# Patient Record
Sex: FEMALE | Race: WHITE
[De-identification: names, ages, dates, MRNs, and addresses within clinical notes are randomized per-mention and may not be internally consistent; named-entity substitution may affect disease eponyms.]

---

## 2021-03-29 ENCOUNTER — HOSPITAL ENCOUNTER (OUTPATIENT)
Dept: HOSPITAL 76 - LAB.R | Age: 31
Discharge: HOME | End: 2021-03-29
Attending: ADVANCED PRACTICE MIDWIFE
Payer: COMMERCIAL

## 2021-03-29 DIAGNOSIS — Z32.01: Primary | ICD-10-CM

## 2021-03-29 LAB
AMPHET UR QL SCN: NEGATIVE
BARBITURATES UR QL SCN>300 NG/ML: NEGATIVE
BENZODIAZ UR QL SCN: NEGATIVE
CLARITY UR REFRACT.AUTO: CLEAR
COCAINE UR-SCNC: NEGATIVE UMOL/L
GLUCOSE UR QL STRIP.AUTO: NEGATIVE MG/DL
KETONES UR QL STRIP.AUTO: NEGATIVE MG/DL
METHADONE UR QL SCN: NEGATIVE
METHAMPHET UR QL SCN: NEGATIVE
NITRITE UR QL STRIP.AUTO: NEGATIVE
OPIATES UR QL SCN: NEGATIVE
PH UR STRIP.AUTO: 6 PH (ref 5–7.5)
PROT UR STRIP.AUTO-MCNC: NEGATIVE MG/DL
RBC # UR STRIP.AUTO: NEGATIVE /UL
RBC # URNS HPF: (no result) /HPF (ref 0–5)
SP GR UR STRIP.AUTO: 1.02 (ref 1–1.03)
SQUAMOUS URNS QL MICRO: (no result)
THC UR QL SCN: NEGATIVE
UROBILINOGEN UR QL STRIP.AUTO: (no result) E.U./DL
UROBILINOGEN UR STRIP.AUTO-MCNC: NEGATIVE MG/DL
VOLATILE DRUGS POS SERPL SCN: (no result)
WBC # UR MANUAL: (no result) /HPF (ref 0–5)

## 2021-03-29 PROCEDURE — 87086 URINE CULTURE/COLONY COUNT: CPT

## 2021-03-29 PROCEDURE — 80306 DRUG TEST PRSMV INSTRMNT: CPT

## 2021-03-29 PROCEDURE — 81001 URINALYSIS AUTO W/SCOPE: CPT

## 2021-04-12 ENCOUNTER — HOSPITAL ENCOUNTER (OUTPATIENT)
Dept: HOSPITAL 76 - DI | Age: 31
Discharge: HOME | End: 2021-04-12
Attending: ADVANCED PRACTICE MIDWIFE
Payer: COMMERCIAL

## 2021-04-12 DIAGNOSIS — O20.8: ICD-10-CM

## 2021-04-12 DIAGNOSIS — Z3A.01: ICD-10-CM

## 2021-04-12 DIAGNOSIS — Z32.01: Primary | ICD-10-CM

## 2021-04-13 NOTE — ULTRASOUND REPORT
PROCEDURE:  OB First Trimester w/TV

 

INDICATIONS:  POSITIVE PREGNANCY TEST

 

OUTSIDE/PRIOR DATING DATA:  

Last menstrual period (LMP): 2/20/2021.  

LMP-based estimated date of delivery (XAVIER): 11/21/2021.  

First dating scan (date and location): 4/12/2021.  

Estimated date of delivery (XAVIER) from first dating scan: 11/29/2021.  

 

TECHNIQUE:  

Real-time scanning was performed of the fetus and maternal pelvic organs, with image documentation.  
Endovaginal scanning was also performed to better visualize the fetus and maternal ovaries.  

 

COMPARISON:  None

 

FINDINGS:     

 

Embryo:  Single live intrauterine pregnancy is identified with crown-rump length measuring 0.99 cm co
rresponding to 7 weeks 0 days. Fetal heart rate is identified at 141 bpm. There is a subchorionic hem
orrhage in the fundal portion measuring approximately 0.8 x 0.7 x 1.0 cm. A second focus near the low
er uterine segment is present measuring 1.3 x 0.3 x 0.7 cm.

 

Measurement variability in dating:  +/- 4 weeks by LMP, +/- 7 days by mean sac diameter (use before 6
 weeks gestation if crown-rump length not able to be measured), +/- 5 days by crown-rump length (6-12
 weeks gestation).  

 

Maternal organs:  Ovaries right corpus luteal cyst is present.. 

 

IMPRESSION:  

 

1. Single live intrauterine pregnancy with gestational age of 7 weeks 0 days.

 

 

2.Two foci of subchorionic hemorrhage as above.

 

Reviewed by: Swapna Ventura MD on 4/13/2021 5:10 PM PDT

Approved by: Swapna Ventura MD on 4/13/2021 5:10 PM PDT

 

 

Station ID:  [id number]

## 2021-05-08 ENCOUNTER — HOSPITAL ENCOUNTER (OUTPATIENT)
Dept: HOSPITAL 76 - LAB | Age: 31
Discharge: HOME | End: 2021-05-08
Attending: ADVANCED PRACTICE MIDWIFE
Payer: COMMERCIAL

## 2021-05-08 DIAGNOSIS — Z36.8A: ICD-10-CM

## 2021-05-08 DIAGNOSIS — Z36.89: ICD-10-CM

## 2021-05-08 DIAGNOSIS — Z34.90: Primary | ICD-10-CM

## 2021-05-08 LAB
BASOPHILS NFR BLD AUTO: 0 10^3/UL (ref 0–0.1)
BASOPHILS NFR BLD AUTO: 0.4 %
EOSINOPHIL # BLD AUTO: 0.3 10^3/UL (ref 0–0.7)
EOSINOPHIL NFR BLD AUTO: 3.4 %
ERYTHROCYTE [DISTWIDTH] IN BLOOD BY AUTOMATED COUNT: 12 % (ref 12–15)
HCT VFR BLD AUTO: 35.9 % (ref 37–47)
HGB UR QL STRIP: 12.5 G/DL (ref 12–16)
LYMPHOCYTES # SPEC AUTO: 2.1 10^3/UL (ref 1.5–3.5)
LYMPHOCYTES NFR BLD AUTO: 22.8 %
MCH RBC QN AUTO: 30.4 PG (ref 27–31)
MCHC RBC AUTO-ENTMCNC: 34.8 G/DL (ref 32–36)
MCV RBC AUTO: 87.3 FL (ref 81–99)
MONOCYTES # BLD AUTO: 0.8 10^3/UL (ref 0–1)
MONOCYTES NFR BLD AUTO: 8.6 %
NEUTROPHILS # BLD AUTO: 5.8 10^3/UL (ref 1.5–6.6)
NEUTROPHILS # SNV AUTO: 9 X10^3/UL (ref 4.8–10.8)
NEUTROPHILS NFR BLD AUTO: 64 %
NRBC # BLD AUTO: 0 /100WBC
NRBC # BLD AUTO: 0 X10^3/UL
PDW BLD AUTO: 10.1 FL (ref 7.9–10.8)
PLATELET # BLD: 181 10^3/UL (ref 130–450)
RBC MAR: 4.11 10^6/UL (ref 4.2–5.4)

## 2021-05-08 PROCEDURE — 86803 HEPATITIS C AB TEST: CPT

## 2021-05-08 PROCEDURE — 86901 BLOOD TYPING SEROLOGIC RH(D): CPT

## 2021-05-08 PROCEDURE — 86900 BLOOD TYPING SEROLOGIC ABO: CPT

## 2021-05-08 PROCEDURE — 86850 RBC ANTIBODY SCREEN: CPT

## 2021-05-08 PROCEDURE — 87389 HIV-1 AG W/HIV-1&-2 AB AG IA: CPT

## 2021-05-08 PROCEDURE — 36415 COLL VENOUS BLD VENIPUNCTURE: CPT

## 2021-05-08 PROCEDURE — 86762 RUBELLA ANTIBODY: CPT

## 2021-05-08 PROCEDURE — 85025 COMPLETE CBC W/AUTO DIFF WBC: CPT

## 2021-05-08 PROCEDURE — 87340 HEPATITIS B SURFACE AG IA: CPT

## 2021-05-08 PROCEDURE — 86787 VARICELLA-ZOSTER ANTIBODY: CPT

## 2021-05-08 PROCEDURE — 86592 SYPHILIS TEST NON-TREP QUAL: CPT

## 2021-05-10 LAB
HEPATITIS B SURFACE ANTIGEN: (no result)
HEPATITIS C ANTIBODY: (no result)
HIV AG/AB 4TH GEN: (no result)
SIGNAL TO CUT-OFF: 0 (ref ?–1)

## 2021-05-22 ENCOUNTER — HOSPITAL ENCOUNTER (OUTPATIENT)
Dept: HOSPITAL 76 - LAB | Age: 31
Discharge: HOME | End: 2021-05-22
Attending: ADVANCED PRACTICE MIDWIFE
Payer: COMMERCIAL

## 2021-05-22 DIAGNOSIS — Z36.89: ICD-10-CM

## 2021-05-22 DIAGNOSIS — Z34.90: Primary | ICD-10-CM

## 2021-05-22 DIAGNOSIS — Z36.8A: ICD-10-CM

## 2021-05-22 LAB
BASOPHILS NFR BLD AUTO: 0 10^3/UL (ref 0–0.1)
BASOPHILS NFR BLD AUTO: 0.3 %
EOSINOPHIL # BLD AUTO: 0.1 10^3/UL (ref 0–0.7)
EOSINOPHIL NFR BLD AUTO: 1.1 %
ERYTHROCYTE [DISTWIDTH] IN BLOOD BY AUTOMATED COUNT: 12.3 % (ref 12–15)
HCT VFR BLD AUTO: 35.9 % (ref 37–47)
HGB UR QL STRIP: 12.6 G/DL (ref 12–16)
LYMPHOCYTES # SPEC AUTO: 2.4 10^3/UL (ref 1.5–3.5)
LYMPHOCYTES NFR BLD AUTO: 20.4 %
MCH RBC QN AUTO: 31 PG (ref 27–31)
MCHC RBC AUTO-ENTMCNC: 35.1 G/DL (ref 32–36)
MCV RBC AUTO: 88.4 FL (ref 81–99)
MONOCYTES # BLD AUTO: 0.6 10^3/UL (ref 0–1)
MONOCYTES NFR BLD AUTO: 5.2 %
NEUTROPHILS # BLD AUTO: 8.6 10^3/UL (ref 1.5–6.6)
NEUTROPHILS # SNV AUTO: 11.9 X10^3/UL (ref 4.8–10.8)
NEUTROPHILS NFR BLD AUTO: 72.4 %
NRBC # BLD AUTO: 0 /100WBC
NRBC # BLD AUTO: 0 X10^3/UL
PDW BLD AUTO: 10.1 FL (ref 7.9–10.8)
PLATELET # BLD: 204 10^3/UL (ref 130–450)
RBC MAR: 4.06 10^6/UL (ref 4.2–5.4)

## 2021-05-22 PROCEDURE — 87340 HEPATITIS B SURFACE AG IA: CPT

## 2021-05-22 PROCEDURE — 86592 SYPHILIS TEST NON-TREP QUAL: CPT

## 2021-05-22 PROCEDURE — 86900 BLOOD TYPING SEROLOGIC ABO: CPT

## 2021-05-22 PROCEDURE — 87389 HIV-1 AG W/HIV-1&-2 AB AG IA: CPT

## 2021-05-22 PROCEDURE — 86901 BLOOD TYPING SEROLOGIC RH(D): CPT

## 2021-05-22 PROCEDURE — 86762 RUBELLA ANTIBODY: CPT

## 2021-05-22 PROCEDURE — 81599 UNLISTED MAAA: CPT

## 2021-05-22 PROCEDURE — 86803 HEPATITIS C AB TEST: CPT

## 2021-05-22 PROCEDURE — 36415 COLL VENOUS BLD VENIPUNCTURE: CPT

## 2021-05-22 PROCEDURE — 85025 COMPLETE CBC W/AUTO DIFF WBC: CPT

## 2021-05-22 PROCEDURE — 86787 VARICELLA-ZOSTER ANTIBODY: CPT

## 2021-05-22 PROCEDURE — 86850 RBC ANTIBODY SCREEN: CPT

## 2021-06-04 ENCOUNTER — HOSPITAL ENCOUNTER (OUTPATIENT)
Dept: HOSPITAL 76 - LAB.WC | Age: 31
Discharge: HOME | End: 2021-06-04
Attending: ADVANCED PRACTICE MIDWIFE
Payer: COMMERCIAL

## 2021-06-04 DIAGNOSIS — Z11.3: Primary | ICD-10-CM

## 2021-06-04 LAB
C TRACH DNA SPEC NAA+PROBE-ACNC: NEGATIVE
N GONORRHOEA DNA GENITAL QL NAA+PROBE: NEGATIVE
T VAGINALIS RRNA GENITAL QL PROBE: NEGATIVE

## 2021-06-04 PROCEDURE — 87491 CHLMYD TRACH DNA AMP PROBE: CPT

## 2021-06-04 PROCEDURE — 87591 N.GONORRHOEAE DNA AMP PROB: CPT

## 2021-06-04 PROCEDURE — 87661 TRICHOMONAS VAGINALIS AMPLIF: CPT

## 2021-07-02 ENCOUNTER — HOSPITAL ENCOUNTER (OUTPATIENT)
Dept: HOSPITAL 76 - LAB | Age: 31
Discharge: HOME | End: 2021-07-02
Attending: ADVANCED PRACTICE MIDWIFE
Payer: COMMERCIAL

## 2021-07-02 DIAGNOSIS — Z36.8A: Primary | ICD-10-CM

## 2021-07-02 PROCEDURE — 84163 PAPPA SERUM: CPT

## 2021-07-02 PROCEDURE — 86336 INHIBIN A: CPT

## 2021-07-02 PROCEDURE — 82105 ALPHA-FETOPROTEIN SERUM: CPT

## 2021-07-02 PROCEDURE — 82677 ASSAY OF ESTRIOL: CPT

## 2021-07-02 PROCEDURE — 84702 CHORIONIC GONADOTROPIN TEST: CPT

## 2021-07-13 ENCOUNTER — HOSPITAL ENCOUNTER (OUTPATIENT)
Dept: HOSPITAL 76 - DI | Age: 31
Discharge: HOME | End: 2021-07-13
Attending: ADVANCED PRACTICE MIDWIFE
Payer: COMMERCIAL

## 2021-07-13 DIAGNOSIS — Z34.02: Primary | ICD-10-CM

## 2021-07-13 DIAGNOSIS — Z36.89: ICD-10-CM

## 2021-07-14 NOTE — ULTRASOUND REPORT
PROCEDURE:  OB Detailed Fetal Eval

 

INDICATIONS:  SUPERVISION OF PREGNANCY

 

OUTSIDE/PRIOR DATING DATA:  

Last menstrual period (LMP): 2/20/2021.  

LMP-based estimated date of delivery (XAVIER): 11/27/2021.  

First dating scan (date and location): 4/12/2021.  

Estimated date of delivery (XAVIER) from first dating scan: 11/29/2021.  

The below data below was generated using the sonographically derived XAVIER of 11/29/2021

 

TECHNIQUE: 

Real-time scanning was performed of the fetus, with image documentation and biometric measurements.  


Endovaginal scanning:  Not performed

 

COMPARISON:  4/12/2021

 

FINDINGS:     

 

General:  A single living intrauterine gestation is present.  

Presentation:  Variable

Placenta:  Placental position is posterior, without previa.  

Amniotic fluid index:  11.9 cm,  normal for gestational age.  

Fetal heart rate:  135 beats per minute.  

Maternal cervical canal:  4.9 cm long; normal length is 2.5 cm or more.  

 

Fetal biometrics:  

Biparietal diameter:  4.7 cm, 20 weeks, 1 day

Head circumference:  18.1 cm, 20 weeks, 4 days

Abdominal circumference:  15.4 cm, 20 weeks, 4 days

Femur length:  3.4 cm, 20 weeks, 4 days

Estimated gestational age from initial scan: 20 weeks, 1 day.  

Composite gestational age from present scan:  20 weeks, 3 days

Estimated fetal weight and percentile:  363 g, 70th percentile

Measurement variability in biometric dating: +/- 10 days from 12-20 weeks gestation, +/- 2 weeks from
 20-30 weeks gestation, +/- 3 weeks at 30 weeks gestation or later.  

 

Anatomic survey:  

Neuro:  Ventricles are normal at less than 10 mm.  Cisterna magna is normal at 3-11 mm.  Cerebellum i
s normal in size and morphology.  

Nuchal skin fold:  Normal at less than 6 mm between 14 and 20 weeks gestational age.  

Face:  Nose and lips, facial profile are normal.  

Spine:  No evidence for spina bifida.  

Heart:  4-chambered heart is present, with normal ventricular outflow tracts.  

Diaphragm:  Diaphragm is intact.  

Stomach:  Left-sided stomach is present.  

Kidneys:  No fetal hydronephrosis.  Normal is less than 5 mm in 2nd trimester, less than 7 mm in 3rd 
trimester.  

Cord:  3 vessel cord has orthotopic insertion.  

Bladder:  Normal in size.  

Extremities:  All 4 extremities are visualized.  

 

IMPRESSION:  

 

1. Single living intrauterine pregnancy with appropriate growth since prior study.

2. Normal fetal anatomy.

3. Closed cervix and normal amniotic fluid volume.

 

Reviewed by: Chuyita Barrientos MD on 7/14/2021 11:22 AM PDT

Approved by: Chuyita Barrientos MD on 7/14/2021 11:22 AM PDT

 

 

Station ID:  [id number]

## 2021-09-10 ENCOUNTER — HOSPITAL ENCOUNTER (OUTPATIENT)
Dept: HOSPITAL 76 - LAB | Age: 31
Discharge: HOME | End: 2021-09-10
Attending: ADVANCED PRACTICE MIDWIFE
Payer: COMMERCIAL

## 2021-09-10 DIAGNOSIS — Z34.90: Primary | ICD-10-CM

## 2021-09-10 DIAGNOSIS — Z36.8A: ICD-10-CM

## 2021-09-10 LAB
ERYTHROCYTE [DISTWIDTH] IN BLOOD BY AUTOMATED COUNT: 13.2 % (ref 12–15)
HCT VFR BLD AUTO: 37.6 % (ref 37–47)
HGB UR QL STRIP: 12.8 G/DL (ref 12–16)
MCH RBC QN AUTO: 31.3 PG (ref 27–31)
MCHC RBC AUTO-ENTMCNC: 34 G/DL (ref 32–36)
MCV RBC AUTO: 91.9 FL (ref 81–99)
NEUTROPHILS # SNV AUTO: 12.3 X10^3/UL (ref 4.8–10.8)
PDW BLD AUTO: 10.1 FL (ref 7.9–10.8)
PLATELET # BLD: 181 10^3/UL (ref 130–450)
RBC MAR: 4.09 10^6/UL (ref 4.2–5.4)

## 2021-09-10 PROCEDURE — 36415 COLL VENOUS BLD VENIPUNCTURE: CPT

## 2021-09-10 PROCEDURE — 82950 GLUCOSE TEST: CPT

## 2021-09-10 PROCEDURE — 85027 COMPLETE CBC AUTOMATED: CPT

## 2021-11-03 ENCOUNTER — HOSPITAL ENCOUNTER (OUTPATIENT)
Dept: HOSPITAL 76 - LAB | Age: 31
Discharge: HOME | End: 2021-11-03
Attending: ADVANCED PRACTICE MIDWIFE
Payer: COMMERCIAL

## 2021-11-03 DIAGNOSIS — Z36.85: Primary | ICD-10-CM

## 2021-11-03 PROCEDURE — 87797 DETECT AGENT NOS DNA DIR: CPT

## 2021-11-21 ENCOUNTER — HOSPITAL ENCOUNTER (OUTPATIENT)
Dept: HOSPITAL 76 - WFO | Age: 31
Discharge: HOME | End: 2021-11-21
Attending: ADVANCED PRACTICE MIDWIFE
Payer: COMMERCIAL

## 2021-11-21 VITALS — SYSTOLIC BLOOD PRESSURE: 123 MMHG | DIASTOLIC BLOOD PRESSURE: 87 MMHG

## 2021-11-21 DIAGNOSIS — Z3A.39: ICD-10-CM

## 2021-11-21 DIAGNOSIS — O47.1: Primary | ICD-10-CM

## 2021-11-21 LAB — IGF BP1 VAG QL: NEGATIVE

## 2021-11-21 PROCEDURE — 84112 EVAL AMNIOTIC FLUID PROTEIN: CPT

## 2021-11-21 PROCEDURE — 99213 OFFICE O/P EST LOW 20 MIN: CPT

## 2021-11-21 PROCEDURE — 59025 FETAL NON-STRESS TEST: CPT

## 2021-11-22 NOTE — PROVIDER PROGRESS NOTE
- HPI


Chief Complaint: Labor Check


Current Pregnancy: 


                                                               Current Pregnancy





EDU                              21


Gestation                        39 Weeks and 1 Days


                          2


Para                             0





Vital Signs











Temperature  36.8 C   21 10:30




















Temperature  36.8 C   21 10:51


 


Heart Rate  92   21 10:51


 


Respiratory Rate  17   21 10:51


 


Blood Pressure  123/87 H  21 10:51


 


O2 Saturation  100   21 10:51














- Procedures


OB Procedure Performed: NST


NST Procedure: 


NST Procedure





Start Date                       21


Start Time                       10:50


Stop Time                        11:10


Vibroacoustic Stimulation Used   No


Patient States Fetal Movement    Yes











- Plan


Plan: 





Krystyna presents to [facility] with c/o gush of fluid from vagina yesterday at 1300. 

She states it has not happened since then and the pad she has on is dry now. She

denies vaginal bleeding. She reports contractions every 4-6 minutes with soft 

resting tone. Reports +FM.





NST performed 2021


NST read 2021


NST reactive. FHR baseline 140s, moderate variability, + accels, no decels


Contractions palpate mild to moderate every 3-8 minutes with soft resting tone





SVE closed/thick/-3, posterior. Vertex.


ROM+ NEGATIVE. Nitrizine negative.





Pt desires to be discharged home and experience early labor in the comfort of 

her own home. 


Pt discharged home with precautions. 


She has emergency contact number. 


She verbalized understanding and agrees to above plan. She denies further 

questions or concerns at this time.








FINAL DIAGNOSIS:


False labor >37wks gestation

## 2021-11-24 ENCOUNTER — HOSPITAL ENCOUNTER (OUTPATIENT)
Dept: HOSPITAL 76 - WFO | Age: 31
Discharge: HOME | End: 2021-11-24
Attending: ADVANCED PRACTICE MIDWIFE
Payer: COMMERCIAL

## 2021-11-24 VITALS — DIASTOLIC BLOOD PRESSURE: 67 MMHG | SYSTOLIC BLOOD PRESSURE: 127 MMHG

## 2021-11-24 DIAGNOSIS — O13.3: Primary | ICD-10-CM

## 2021-11-24 DIAGNOSIS — Z3A.39: ICD-10-CM

## 2021-11-24 LAB
ALBUMIN DIAFP-MCNC: 3 G/DL (ref 3.2–5.5)
ALBUMIN/GLOB SERPL: 1 {RATIO} (ref 1–2.2)
ALP SERPL-CCNC: 149 IU/L (ref 42–121)
ALT SERPL W P-5'-P-CCNC: 19 IU/L (ref 10–60)
ANION GAP SERPL CALCULATED.4IONS-SCNC: 11 MMOL/L (ref 6–13)
AST SERPL W P-5'-P-CCNC: 21 IU/L (ref 10–42)
BASOPHILS NFR BLD AUTO: 0 10^3/UL (ref 0–0.1)
BASOPHILS NFR BLD AUTO: 0.4 %
BILIRUB BLD-MCNC: 0.4 MG/DL (ref 0.2–1)
BUN SERPL-MCNC: 8 MG/DL (ref 6–20)
CALCIUM UR-MCNC: 8.9 MG/DL (ref 8.5–10.3)
CHLORIDE SERPL-SCNC: 101 MMOL/L (ref 101–111)
CO2 SERPL-SCNC: 22 MMOL/L (ref 21–32)
CREAT SERPLBLD-SCNC: 0.5 MG/DL (ref 0.4–1)
CREAT UR-SCNC: 84.3 MG/DL
EOSINOPHIL # BLD AUTO: 0.1 10^3/UL (ref 0–0.7)
EOSINOPHIL NFR BLD AUTO: 0.9 %
ERYTHROCYTE [DISTWIDTH] IN BLOOD BY AUTOMATED COUNT: 13.1 % (ref 12–15)
GFRSERPLBLD MDRD-ARVRAT: 145 ML/MIN/{1.73_M2} (ref 89–?)
GLOBULIN SER-MCNC: 3.1 G/DL (ref 2.1–4.2)
GLUCOSE SERPL-MCNC: 127 MG/DL (ref 70–100)
HCT VFR BLD AUTO: 35 % (ref 37–47)
HGB UR QL STRIP: 11.8 G/DL (ref 12–16)
LYMPHOCYTES # SPEC AUTO: 2 10^3/UL (ref 1.5–3.5)
LYMPHOCYTES NFR BLD AUTO: 25.6 %
MCH RBC QN AUTO: 30.6 PG (ref 27–31)
MCHC RBC AUTO-ENTMCNC: 33.7 G/DL (ref 32–36)
MCV RBC AUTO: 90.9 FL (ref 81–99)
MONOCYTES # BLD AUTO: 0.5 10^3/UL (ref 0–1)
MONOCYTES NFR BLD AUTO: 7.1 %
NEUTROPHILS # BLD AUTO: 5 10^3/UL (ref 1.5–6.6)
NEUTROPHILS # SNV AUTO: 7.6 X10^3/UL (ref 4.8–10.8)
NEUTROPHILS NFR BLD AUTO: 65.3 %
NRBC # BLD AUTO: 0 /100WBC
NRBC # BLD AUTO: 0 X10^3/UL
PDW BLD AUTO: 10.7 FL (ref 7.9–10.8)
PLATELET # BLD: 163 10^3/UL (ref 130–450)
POTASSIUM SERPL-SCNC: 3.5 MMOL/L (ref 3.5–5)
PROT 24H UR-MCNC: 9 MG/DL
PROT SPEC-MCNC: 6.1 G/DL (ref 6.7–8.2)
RBC MAR: 3.85 10^6/UL (ref 4.2–5.4)
SODIUM SERPLBLD-SCNC: 134 MMOL/L (ref 135–145)

## 2021-11-24 PROCEDURE — 59025 FETAL NON-STRESS TEST: CPT

## 2021-11-24 PROCEDURE — 85025 COMPLETE CBC W/AUTO DIFF WBC: CPT

## 2021-11-24 PROCEDURE — 99212 OFFICE O/P EST SF 10 MIN: CPT

## 2021-11-24 PROCEDURE — 80053 COMPREHEN METABOLIC PANEL: CPT

## 2021-11-24 PROCEDURE — 82570 ASSAY OF URINE CREATININE: CPT

## 2021-11-24 PROCEDURE — 84156 ASSAY OF PROTEIN URINE: CPT

## 2021-11-24 PROCEDURE — 36415 COLL VENOUS BLD VENIPUNCTURE: CPT

## 2021-11-24 NOTE — PROVIDER PROGRESS NOTE
- HPI


Chief Complaint: Hypertension/PIH


Current Pregnancy: 


                                                               Current Pregnancy





EDU                              21


Gestation                        39 Weeks and 4 Days


                          2


Para                             0





Vital Signs











Temperature  37.0 C   21 12:10


 


Heart Rate  93   21 12:10


 


Respiratory Rate  18   21 12:10


 


Blood Pressure  127/67   21 12:10


 


O2 Saturation  100   21 12:10




















Temperature  37.0 C   21 12:40


 


Heart Rate  93   21 12:10


 


Respiratory Rate  18   21 12:10


 


Blood Pressure  127/67   21 12:10


 


O2 Saturation  100   21 12:10














- Procedures


OB Procedure Performed: NST


Diagnosis/Indication for NST: Gestational Hypertension


NST Procedure: 


NST Procedure





Start Date                       21


Start Time                       12:00


Stop Time                        12:30


Vibroacoustic Stimulation Used   No


Patient States Fetal Movement    Yes











- Plan


Plan: 





Krystyna presents to [facility] following her routine office visit secondary to mildly

elevated blood pressure. She denies HA, visual disturbances, RUQ or epigastric 

pain. She denies vaginal bleeding or leakage of fluid. She reports +FM.





NST performed 2021


NST read 2021


NST reactive. FHR baseline 145, moderate variability, + accels, no decels


No contractions appreciated via tocometry





BPs normotensive for duration of time on [facility].





CBC:


   Hgb/Hct 11.8/35.0


   


CMP:


   AST 21


   ALT 19


   Creatinine 0.5


Urine protein/creatinine ratio: 0.1





Pt released home with precautions. 


Has emergency contact number.


Reviewed PIH warning s/sx and when to present.


F/u at [facility] on Friday morning for nurse visit for BP check

or sooner if needed.


Pt verbalized understanding and agrees to above plan. She denies further 

questions or concerns at this time.





FINAL DIAGNOSIS:


Elevated BP without the diagnosis of hypertension

## 2021-12-01 ENCOUNTER — HOSPITAL ENCOUNTER (INPATIENT)
Dept: HOSPITAL 76 - WFO | Age: 31
LOS: 3 days | Discharge: HOME | End: 2021-12-04
Attending: ADVANCED PRACTICE MIDWIFE | Admitting: ADVANCED PRACTICE MIDWIFE
Payer: COMMERCIAL

## 2021-12-01 DIAGNOSIS — Z3A.40: ICD-10-CM

## 2021-12-01 LAB
BASOPHILS NFR BLD AUTO: 0 10^3/UL (ref 0–0.1)
BASOPHILS NFR BLD AUTO: 0.2 %
EOSINOPHIL # BLD AUTO: 0 10^3/UL (ref 0–0.7)
EOSINOPHIL NFR BLD AUTO: 0.2 %
ERYTHROCYTE [DISTWIDTH] IN BLOOD BY AUTOMATED COUNT: 13.1 % (ref 12–15)
HCT VFR BLD AUTO: 36.6 % (ref 37–47)
HGB UR QL STRIP: 13 G/DL (ref 12–16)
IGF BP1 VAG QL: POSITIVE
LYMPHOCYTES # SPEC AUTO: 1.8 10^3/UL (ref 1.5–3.5)
LYMPHOCYTES NFR BLD AUTO: 13.7 %
MCH RBC QN AUTO: 31.9 PG (ref 27–31)
MCHC RBC AUTO-ENTMCNC: 35.5 G/DL (ref 32–36)
MCV RBC AUTO: 89.7 FL (ref 81–99)
MONOCYTES # BLD AUTO: 0.6 10^3/UL (ref 0–1)
MONOCYTES NFR BLD AUTO: 4.6 %
NEUTROPHILS # BLD AUTO: 10.5 10^3/UL (ref 1.5–6.6)
NEUTROPHILS # SNV AUTO: 13 X10^3/UL (ref 4.8–10.8)
NEUTROPHILS NFR BLD AUTO: 80.8 %
NRBC # BLD AUTO: 0 /100WBC
NRBC # BLD AUTO: 0 X10^3/UL
PDW BLD AUTO: 10.9 FL (ref 7.9–10.8)
PLATELET # BLD: 156 10^3/UL (ref 130–450)
RBC MAR: 4.08 10^6/UL (ref 4.2–5.4)

## 2021-12-01 PROCEDURE — 85025 COMPLETE CBC W/AUTO DIFF WBC: CPT

## 2021-12-01 PROCEDURE — 86900 BLOOD TYPING SEROLOGIC ABO: CPT

## 2021-12-01 PROCEDURE — 59025 FETAL NON-STRESS TEST: CPT

## 2021-12-01 PROCEDURE — 86850 RBC ANTIBODY SCREEN: CPT

## 2021-12-01 PROCEDURE — 86901 BLOOD TYPING SEROLOGIC RH(D): CPT

## 2021-12-01 PROCEDURE — 99215 OFFICE O/P EST HI 40 MIN: CPT

## 2021-12-01 PROCEDURE — 36415 COLL VENOUS BLD VENIPUNCTURE: CPT

## 2021-12-01 PROCEDURE — 84112 EVAL AMNIOTIC FLUID PROTEIN: CPT

## 2021-12-01 PROCEDURE — 96372 THER/PROPH/DIAG INJ SC/IM: CPT

## 2021-12-01 RX ADMIN — SODIUM CHLORIDE, POTASSIUM CHLORIDE, SODIUM LACTATE AND CALCIUM CHLORIDE SCH MLS/HR: 600; 310; 30; 20 INJECTION, SOLUTION INTRAVENOUS at 22:11

## 2021-12-01 RX ADMIN — SODIUM CHLORIDE, POTASSIUM CHLORIDE, SODIUM LACTATE AND CALCIUM CHLORIDE SCH MLS/HR: 600; 310; 30; 20 INJECTION, SOLUTION INTRAVENOUS at 18:00

## 2021-12-01 NOTE — ANESTHESIA
Pre-Anesthesia VS, & Labs





- Diagnosis





active labor





- Procedure





labor epidural


Vital Signs: 





                                        











Temp Pulse Resp BP Pulse Ox


 


 36.9 C   84   22   134/86 H   


 


 21 14:27  21 07:18  21 07:18  21 07:18   











Height: 5 ft 2 in


Weight (kg): 85.275 kg


Body Mass Index: 34.4


BMI Classification: Obese





- NPO


>8 hours





- Pregnancy


Is Patient Pregnant?: Yes





- Lab Results


Current Lab Results: 





Laboratory Tests





21 14:00: Blood Type O POSITIVE, Antibody Screen NEGATIVE


21 13:06: WBC 13.0 H, RBC 4.08 L, Hgb 13.0, Hct 36.6 L, MCV 89.7, MCH 31.9

H, MCHC 35.5, RDW 13.1, Plt Count 156, MPV 10.9 H, Neut # (Auto) 10.5 H, Lymph #

(Auto) 1.8, Mono # (Auto) 0.6, Eos # (Auto) 0.0, Baso # (Auto) 0.0, Absolute 

Nucleated RBC 0.00, Nucleated RBC % 0.0








Fish Bones: 


                                 21 13:06








Home Medications and Allergies





Active Medications





Carboprost Tromethamine (Carboprost Tromethamine 250 Mcg/Ml Amp)  250 mcg IM 

Q15M PRN


   PRN Reason: Step 4: Hemorrhage protocol


   Stop: 21 12:52


Fentanyl (Fentanyl 100 Mcg/2 Ml Vial)  50 mcg IVP ONCE SAM


   Stop: 21 20:59


   Last Admin: 21 20:26 Dose:  50 mcg


   Documented by: 


Lactated Ringer's (Lr)  1,000 mls @ 150 mls/hr IV .Q6H40M SAM


   Last Admin: 21 18:00 Dose:  150 mls/hr


   Documented by: 


Oxytocin/Sodium Chloride (Pitocin/Sodium Chloride)  500 mls @ 999 mls/hr IV PRN 

PRN; Protocol


   PRN Reason: POST-PARTUM HEMORR PREVENTION


   Stop: 21 12:52


Tranexamic Acid (Tranexamic 1,000 Mg/100ml-Nacl)  1,000 mg in 100 mls @ 600 

mls/hr IV .ONCE PRN


   PRN Reason: EBL >1200mL and within 3hr


   Stop: 21 12:52


Oxytocin/Sodium Chloride (Pitocin/Sodium Chloride)  500 mls @ 1 mls/hr IV TITR 

SAM; Protocol


   Last Admin: 21 19:31 Dose:  1 milliunit/min, 1 mls/hr


   Documented by: 


Lidocaine HCl (Lidocaine-Mpf 1% 30 Ml Vial)  30 ml ID .ONCE PRN


   PRN Reason: PERINEAL REPAIR


   Stop: 21 12:52


Methylergonovine Maleate (Methylergonovine 0.2 Mg/Ml Vial)  0.2 mg IM .ONCE PRN


   PRN Reason: Step 2: Hemorrhage protocol


   Stop: 21 12:52


Misoprostol (Misoprostol 200 Mcg Tablet)  800 mcg BC .ONCE PRN


   PRN Reason: Step 3: Hemorrhage protocol


   Stop: 21 12:52


Ondansetron HCl (Ondansetron 4 Mg/2 Ml Vial)  4 mg IVP Q4H PRN


   PRN Reason: Nausea / Vomiting


Oxytocin (Oxytocin 10 Unit/Ml Vial)  10 unit IM .ONCE PRN


   PRN Reason: Step one: If no IV access


   Stop: 21 12:52


Sodium Chloride (Sodium Chloride Flush 0.9% 10 Ml Syringe)  10 ml IVP PRN PRN


   PRN Reason: AS NEEDED PER PROVIDER ORDERS


Sodium Chloride (Sodium Chloride Flush 0.9% 10 Ml Syringe)  10 ml IVP 

0100,0900,1700 [facility]








Allergies/Adverse Reactions: 


                                    Allergies











Allergy/AdvReac Type Severity Reaction Status Date / Time


 


latex Allergy Intermediate Rash Verified 21 07:32


 


minocycline AdvReac Severe Hives Verified 21 07:31














Anes History & Medical History





- Anesthetic History


Anesthesia Complications: reports: No previous complications


Family history of Anesthesia Complications: Denies


Family history of Malignant Hyperthermia: Denies





- Medical History


Smoking Status: Never smoker





- Obstetrical History


: 2


Parity: 0


Prenatal Events: reports: None


Pregnancy Complications: reports: None





Exam


General: Alert, Oriented x3, Cooperative


Dental: WNL


Mouth Opening: 3 Fingerbreadth


Neck Mobility: Normal


Mallampati classification: II


Respiratory: Lungs clear


Cardiovascular: Regular rate





Plan


Anesthesia Type: Epidural


Consent for Procedure(s) Verified and Reviewed: Yes


Code Status: Attempt Resuscitation


ASA classification: 2-Mild systemic disease


Is this case an emergency?: No

## 2021-12-01 NOTE — HISTORY & PHYSICAL EXAMINATION
Prenatal Admit History





- Visit Reason


Visit Reason: Contractions, Membranes rupture





- Pregnancy


: 2


Parity: 0


Premature: 0


Ectopic: 0


: 1


Prenatal Care: positive: [facility]


Prenatal Risk/History: positive: None


Pregnancy Complications This Pregnancy: positive: None


Smoking Status: Never smoker





- Mother's Labs


Mother's Blood Type: positive: O


Mother's RH: positive: Positive


GBS: positive: Group B Step Negative


Rubella Status: positive: Immune





Meds/Allgy





- Allergies


Allergies/Adverse Reactions: 


                                    Allergies











Allergy/AdvReac Type Severity Reaction Status Date / Time


 


latex Allergy Intermediate Rash Verified 21 07:32


 


minocycline AdvReac Severe Hives Verified 21 07:31














Review of Systems





- Constitutional


Constitutional: denies: Fatigue, Fever, Chills





- Eyes


Eyes: denies: Blurred vision, Spots in vision, Dipolpia





- Cardiovascular


Cariovascular: denies: Chest pain, Edema





- Respiratory


Respiratory: denies: Cough, SOB at rest





- Gastrointestinal


Gastrointestinal: denies: Constipation, Diarrhea, Change in bowel habits, 

Nausea, Vomiting





- Integumentary


Integumentary: denies: Rash, Pruritis





- Neurological


Neurological: denies: Headache





Physical





- Abdominal Exam


Vital Signs: 





                                        











Temp Pulse Resp BP Pulse Ox


 


 37.1 C   84   22   134/86 H   


 


 21 04:46  21 07:18  21 07:18  21 07:18   











Contraction Frequency (min/apart): 3-8


Contraction Intensity: positive: Mild


Uterine Resting Tone: positive: Soft





- Fetal Monitoring


Fetal Heart Rate Baseline: 130


Fetal Strip Review: positive: Category I





- Presentation


Presentation: positive: Vertex





- Vaginal Exam


Membranes: positive: Membranes ruptured


Dilation (in cm): 2-3


Effacement (%): 90


Station: positive: -1





- Speculum Exam


Speculum Exam Performed: positive: No


Findings: positive: Other





Plan for Labor





- Plan For Labor


I expect patient to be DC'd or transferred within 96 hours.: Yes


Plan for Labor: 





Krystyna presents to [facility] with c/o contractions. She was given therapeutic rest 

and changes her SVE from 1-2.5 dilation. She has been in prodromal labor x 1.5 

weeks. She states her contractions have continued to increase in frequency and 

intensity over the past 24 hours. She denies vaginal bleeding or leakage of 

fluid upon admission. She reports +FM. 


Following her therapeutic rest she was up to the bathroom and felt a large gush 

of fluid. a ROM+ was collected and returned positive. She has been a patient of 

[facility] through the duration of her pregnancy which has 

remained uncomplicated. She is supported by her  Ant and her .





Dating criteria:


LMP 2021


Initial U/S @ @ 7.0wks c/w LMP dating


Serial exams - agree





OB Hx:


G1: 2016 9wk SAB


G2: Current





PMHx: Anxiety, depression, UTI-recurrent


Surgical Hx: Knee arthroscopy (miniscus tear )


Social Hx: Never smoker. No ETOH or IVDA.  is active duty - Ant


Family Hx: Breast cancer - stage 1 - Mother; Uterine cancer - PGM; Ovarian 

cancer - PGM; Colon cancer - MGF; nonmelanoma skin cancer - father; Hypertension

- mother, MGM; Arthritis - mother, PGM; diabetes - PGM; Psychiatric care - 

mother; Alcoholism - father, uncle





Medications: PNV; zyrtec, flonase; famotidine


Allergies: Minocycline (severe), Latex (moderate)





Prenatal course:


LMP: 2021


XAVIER by LMP:2021


Initial U/S:2021@ 7.0wks([id number])c/w LMP


FINAL XAVIER: 2021


O pos/Rubella immune


VZV: immune


Genetic testing: Serum integrated - NEGATIVE


FAS: WNL. Posterior placenta, no previa. 3VC. Size c/w dating (EFW 70%tile)


Glucola 126


Influenza: Given 2021


TDAP 21


COVID-19 vaccine: Moderna #1- ; #2 /


GBS @ 36.4wks-Negative


HSV: denies in self and partner


Breast pump Rx- 21


MOD: Anticipate ;  Ant; "wait and see" but desires to attempt 

without epidural; breastfeeding. BOY: Mayito Pascal


pp contraception: nexplanon. educated to referral process with 


pap: 2020- nilm





Physical Exam:


Normocephalic, atraumatic


Heart RRR w/o M/G/R


Lungs CTAB


Abdomen gravid, soft, nontender


EFW 3400g


FHR baseline 140s, moderate variability, + accels, no decels


Contractions palpate mild to moderate every 2-6 minutes with soft resting tone


SVE 2-3/90/-1, posterior. Vertex.


SROM @ 1200, moderate amount of clear fluid.


Bilateral LE's trace edema.


Mood is good.





Assessment:


30yo  @ 40.3wks gestation by LMP c/w 7.0wk U/S


SROM


Early labor


GBS neg


FHR Category I





Plan:


Admit for expectant management x 4 hours.


Initiate pitocin for labor augmentation with titration per protocol if unchanged

x 4 hours.


Intermittent fetal heart rate auscultation until initiation of pitocin, then 

continuous fetal monitoring.


Jacuzzi PRN. Nitrous oxide PRN.


Epidural per maternal request.


Anticipate .

## 2021-12-01 NOTE — PROVIDER PROGRESS NOTE
- HPI


Chief Complaint: Labor Check


Current Pregnancy: 


                                                               Current Pregnancy





EDU                              21


Gestation                        40 Weeks and 4 Days


                          2


Para                             0





Vital Signs











Temperature  37.1 C   21 04:46


 


Heart Rate  85   21 04:46


 


Respiratory Rate  22   21 04:46


 


Blood Pressure  132/87 H  21 04:46




















Temperature  36.9 C   21 14:27


 


Heart Rate  84   21 07:18


 


Respiratory Rate  22   21 07:18


 


Blood Pressure  134/86 H  21 07:18


 


O2 Saturation      














- Procedures


OB Procedure Performed: NST


Diagnosis/Indication for NST: Other


NST Procedure: 


NST Procedure





Start Date                       21


Start Time                       05:25


Stop Time                        06:00


Vibroacoustic Stimulation Used   No


Patient States Fetal Movement    Yes











- Plan


Plan: 





Krystyna presents today with c/o contractions which have increased in frequency 

and intensity since last night. She denies vaginal bleeding. She states she has 

lost more of her mucus plug which has had some light blood tinges to it 

occasionally. She denies leakage of fluid. She reports +FM. She is supported by 

her  Ant today.





NST performed 2021


NST read 2021


NST reactive. FHR baseline 140s, moderate variability, + accels, no decels


Contractions palpate mild every 3-8 minutes with soft resting tone





SVE 1/50/-1, posterior and vertex.


Pt ambulated in hallway x 1.5 hours and repeat SVE 1-2/50/-1 and vertex.





Reviewed options for management including discharge home with precautions, vs 

augmentation of labor vs therapeutic rest. 


Pt elects to proceed with therapeutic rest.





Morphine 10mg IM and phenergan 25mg PO administered and pt allowed to rest for 

several hours. When pt awakens she states she feels much better. The 

contractions have again started to  and she is breathing through them. 

When up to the bathroom she notes large gush of clear fluid from her vagina. 

Small amount of blood show noted on bed where she was laying to sleep.


 


SVE 2-3/90/0. Vertex.





ROM+ POSITIVE.





Plan:


Admit for expectant management.

## 2021-12-01 NOTE — PROVIDER PROGRESS NOTE
Labor Progress Note





- Uterine Monitoring


Uterine Monitoring Mode: positive: External toco


Contraction Frequency (min/apart): 2-6


Contraction Intensity: positive: Moderate


Uterine Resting Tone: positive: Soft





- Fetal Monitoring


Fetal Monitor Mode: positive: External ultrasound


Fetal Heart Rate Baseline: 140


Fetal Heart Rate Variability: positive: Moderate (6-25 bmp)


Fetal Accelerations: positive: Present, 15x15


Fetal Decelerations: positive: None


Fetal Strip Review: positive: Category I





- Vaginal Exam


Dilation (in cm): 4


Effacement (%): 90


Station: -1


Cervical Position: Midposition





- Labor Progress Note


Labor Progress Note/Additional Text: 





S: Breathing and moaning through contractions. Her  and  are support

ciera at the bedside. Wants to try jacuzzi followed by nitrous oxide prior to 

getting an epidural but she states she is not opposed to getting an epidural. 

She has been unable to sleep for the past several nights and is feeling very 

tired.


O: FHR baseline 140s, moderate variability, + accels, no decels


Contractions palpate moderate every 2-5 minutes with soft resting tone.


SVE 4/90/-1. vertex.


Forebag of amniotic fluid released and clear. SROM x 8 hours.


A: 32yo  @ 40.4wks gestation 


Early labor


GBS neg


FHR Category I


P: Continuous fetal monitoring


Continue pitocin for labor augmentation with titration per protocol.


Jacuzzi PRN. Nitrous oxide PRN.


Epidural per maternal request.


Anticipate .

## 2021-12-02 RX ADMIN — IBUPROFEN SCH MG: 800 TABLET, FILM COATED ORAL at 13:12

## 2021-12-02 RX ADMIN — DOCUSATE SODIUM SCH MG: 100 CAPSULE, LIQUID FILLED ORAL at 23:16

## 2021-12-02 RX ADMIN — ACETAMINOPHEN SCH MG: 500 TABLET ORAL at 23:15

## 2021-12-02 RX ADMIN — IBUPROFEN SCH MG: 800 TABLET, FILM COATED ORAL at 07:07

## 2021-12-02 RX ADMIN — ACETAMINOPHEN SCH MG: 500 TABLET ORAL at 07:34

## 2021-12-02 RX ADMIN — DOCUSATE SODIUM SCH MG: 100 CAPSULE, LIQUID FILLED ORAL at 12:11

## 2021-12-02 RX ADMIN — IBUPROFEN SCH MG: 800 TABLET, FILM COATED ORAL at 19:54

## 2021-12-02 RX ADMIN — ACETAMINOPHEN SCH MG: 500 TABLET ORAL at 15:38

## 2021-12-02 NOTE — DELIVERY NOTE
Delivery Note





- Labor


Labor: positive: Augmented by oxytocin





- Infant Delivery Method


Infant Delivery Method: positive: Spontaneous vaginal delivery





- Birth Presentation


Birth Presentation: positive: Vertex, OA - occiput anterior





- Nuchal Cord


Nuchal Cord: positive: Present





- Amniotic Fluid Description


Amniotic Fluid Description: positive: Clear





- Episiotomy Type


Episiotomy Type: positive: None





- Laceration


Laceration: positive: None





- Delivery Outcome


Delivery Outcome: positive: Livebirth





- [place]


: positive: Placed in direct skin contact with mother, Stimulated, 

[place] used, Warmer used


 sex: positive: Male





- Cord


Cord: positive: 3 vessels





- Placenta


Placenta: positive: Intact, Spontaneous





- Estimated Blood Loss


Estimated Blood Loss (in cc): 250





- Post Delivery Events


Post Delivery Events: positive: No post delivery events





- Delivery Comments (Free Text/Narrative)


Delivery Comments (Free Text/Narrative): 





Labor: This 32yo G2>0010 @ 40.4wks gestation by LMP c/w 7.0wk U/S who presented 

on 2021 in early labor. She was given therapeutic rest which was followed 

by SROM and was noted to be a moderate amount of clear fluid. Cervix was 

2-3/90/-1 and vertex. FHR pattern demonstrated a Category I pattern with 

occasional Category II pattern secondary to variable decelerations however 

overall remained reassuring. She was augmented with Pitocin for a maximum 

infusion rate of 3mU/mL. Epidural placed per maternal request. Pt progressed to 

c/c/0 @ 0306 with onset of spontaneous, active pushing at 0312.


Birth: Normal  of viable male infant on 2021 @ 0437. Tight nuchal cord 

unable to be reduced at perineum and  was somersaulted through. The 

 was placed on maternal abdomen, stimulated, and dried. Poor tone, color,

and lack of respiratory effort noted. Umbilical cord was doubly clamped by CNM 

and cut by FOB. Cord blood was obtained. 3VC. Apgar's were 3/5/8 at 1, 5, and 10

minutes respectively. (see infant charting for resuscitative measures). 

Respiratory therapy presence requested at bedside for assistance. On call 

pediatrician notified to present for evaluation of . Fundal massage and 

gentle cord traction applied for active management of the third stage. Placenta 

delivered spontaneously and intact at 0446. Pitocin administered via IV for 

hemostasis. EBL 250mL.


Fourth stage: Uterine fundus firm and there is no excessive bleeding. The 

perineum, vagina, and cervix were inspected and noted to be intact. Mother was 

left in stable condition. Baby in care of resuscitation team and pediatrician.

## 2021-12-03 RX ADMIN — IBUPROFEN SCH MG: 800 TABLET, FILM COATED ORAL at 21:16

## 2021-12-03 RX ADMIN — ACETAMINOPHEN SCH MG: 500 TABLET ORAL at 23:23

## 2021-12-03 RX ADMIN — IBUPROFEN SCH MG: 800 TABLET, FILM COATED ORAL at 15:04

## 2021-12-03 RX ADMIN — DOCUSATE SODIUM SCH MG: 100 CAPSULE, LIQUID FILLED ORAL at 21:16

## 2021-12-03 RX ADMIN — DOCUSATE SODIUM SCH MG: 100 CAPSULE, LIQUID FILLED ORAL at 09:21

## 2021-12-03 RX ADMIN — ACETAMINOPHEN SCH MG: 500 TABLET ORAL at 08:37

## 2021-12-03 RX ADMIN — ACETAMINOPHEN SCH MG: 500 TABLET ORAL at 15:04

## 2021-12-03 RX ADMIN — IBUPROFEN SCH MG: 800 TABLET, FILM COATED ORAL at 03:20

## 2021-12-03 RX ADMIN — IBUPROFEN SCH MG: 800 TABLET, FILM COATED ORAL at 09:21

## 2021-12-03 NOTE — PROVIDER PROGRESS NOTE
Subjective





- Subjective


Subjective: 





S: Bonding well with baby. Breastfeeding without difficulty. Bleeding decreased 

and is light. Pain is well controlled with oral medications.  supportive 

at the bedside.


O: /73, T 36.9, HR 85, RR 17


Heart RRR w/o M/G/R, lungs CTAB, abdomen gravid, soft, nontender with fundus 

firm at U, perineum intact, light lochia rubra, bilateral LE's trace edema.


A: 32yo -->P1 PPD#1 s/p TSVD viable male infant


breastfeeding


normal recovery


P: Reviewed postpartum care, medications, and warning s/sx. 


Continue routine postpartum care and medications today.


Reviewed possible discharge home this evening with direction from pediatrician. 


Encouraged her to utilize our outpatient lactation services following discharge 

as needed.


Discussed with RN to notify provider for discharge order later this evening/this

afternoon if desire to be discharged.


Pt verbalized understanding and agrees to above plan. She denies further 

questions or concerns at this time.





Objective





- Vital Signs/Intake & Output


Vital Signs: 


                                Vital Signs x48h











  Temp Pulse Resp BP Pulse Ox


 


 21 03:26  36.9 C  85  17  122/73  99











Intake & Output: 


                                 Intake & Output











 21





 23:59 23:59 23:59 23:59


 


Intake Total  627.5 3489.1 140


 


Output Total   1900 


 


Balance  627.5 1589.1 140














- Lab Results


Fish Bones: 


                                 21 13:06

## 2021-12-04 VITALS — DIASTOLIC BLOOD PRESSURE: 72 MMHG | SYSTOLIC BLOOD PRESSURE: 134 MMHG

## 2021-12-04 RX ADMIN — DOCUSATE SODIUM SCH MG: 100 CAPSULE, LIQUID FILLED ORAL at 10:26

## 2021-12-04 RX ADMIN — IBUPROFEN SCH MG: 800 TABLET, FILM COATED ORAL at 03:06

## 2021-12-04 RX ADMIN — IBUPROFEN SCH MG: 800 TABLET, FILM COATED ORAL at 10:25

## 2021-12-04 RX ADMIN — ACETAMINOPHEN SCH MG: 500 TABLET ORAL at 10:25

## 2021-12-04 NOTE — DISCHARGE PLAN
Discharge Plan


Problem Reviewed?: Yes


Disposition: 01 Home, Self Care


Condition: Good


Diet: Regular


Activity Restrictions: No Restrictions


Shower Restrictions: No


Driving Restrictions: No


Weight Bearing: Full Weight


Instruction Topics:  Birth Vaginal After


No Smoking: If you smoke, Please STOP!  Call [phone] for help.


Follow-up with: 


Malgorzata Chang CNM, ARNP [Provider Admit Priv/Credential] - 1 Week

## 2021-12-04 NOTE — DISCHARGE SUMMARY
Discharge Summary


Condition at Discharge: Good


Discharge Disposition: 01 Home, Self Care





- HOSPITAL COURSE


Hospital Course: 


Date of Admission: 2021





Date of Discharge: 2021





Diagnosis on Admission:


1. 30yo  @ 40.3wks gestation by LMP c/w 7.0wk U/S


2. SROM


3. Early labor


4. GBS neg


5. FHR Category I





Diagnosis on Discharge:


1. 30yo  PPD#2 s/p TSVD viable male infant


2. Breastfeeding


3. Normal recovery





Brief History: She is a patient of [facility] who presented on 

2021 with c/o contractions. She was given therapeutic rest with morphine 

and phenergan and immediately following experienced SROM. She was augmented with

pitocin for a maximum infusion rate of 3mu/mL. Epidural placed per maternal 

request. Pt progressed to spontaneously deliver a viable male infant on 

2021 @ 0437. Apgars were 3/5/8 and 1, 5 and 10 minutes respectively. 

Infant was evaluated and managed at infant warmer and was given 2 minutes of PPV

and several minutes of blow by O2. Pediatrician present shortly after delivery 

for evaluation and management. Infant transitioned well following resuscitative 

measures. Perineum intact. EBL 250mL.


She has been doing well in her postpartum course. She is ambulating and tolerate

a regular diet. She is urinating without difficulty and her lochia is normal. 

Her pain is well controlled with oral medications. She is bonding well with her 

baby and breastfeeding with little difficulty. Infant is jaundiced and has been 

under lights so he often seems sleepy at the breast but she reports she feels 

they are overall doing well with nursing. She will be discharged home today on 

postpartum day #2 with instructions to continue taking her prenatal vitamin 

while breastfeeding and to continue taking ibuprofen and tylenol over the 

counter as needed for pain management. She intends to follow up with myself at 

[facility] in 1 week or sooner if needed. She has been given 

precautions to call if she has any worsening fevers, chills, abdominal pain, 

increased vaginal bleeding or foul smelling vaginal lochia. 





Physical Exam:


Normocephalic, atraumatic, Heart RRR w/o M/G/R, lungs CTAB, abdomen soft and 

nontender with fundus firm at U-2, perineum intact, light lochia rubra, 

bilateral LE's no edema.





- ALLERGIES


Allergies/Adverse Reactions: 


                                    Allergies











Allergy/AdvReac Type Severity Reaction Status Date / Time


 


latex Allergy Intermediate Rash Verified 21 07:32


 


minocycline AdvReac Severe Hives Verified 21 07:31














- LABS


Result Diagrams: 


                                 21 13:06

## 2024-08-07 ENCOUNTER — HOSPITAL ENCOUNTER (OUTPATIENT)
Dept: HOSPITAL 76 - LAB | Age: 34
Discharge: HOME | End: 2024-08-07
Attending: ADVANCED PRACTICE MIDWIFE
Payer: COMMERCIAL

## 2024-08-07 DIAGNOSIS — O20.0: Primary | ICD-10-CM

## 2024-08-07 PROCEDURE — 84702 CHORIONIC GONADOTROPIN TEST: CPT

## 2024-08-07 PROCEDURE — 36415 COLL VENOUS BLD VENIPUNCTURE: CPT

## 2024-08-09 ENCOUNTER — HOSPITAL ENCOUNTER (OUTPATIENT)
Dept: HOSPITAL 76 - LAB.N | Age: 34
Discharge: HOME | End: 2024-08-09
Attending: ADVANCED PRACTICE MIDWIFE
Payer: COMMERCIAL

## 2024-08-09 DIAGNOSIS — O20.0: Primary | ICD-10-CM

## 2024-08-09 PROCEDURE — 36415 COLL VENOUS BLD VENIPUNCTURE: CPT

## 2024-08-09 PROCEDURE — 84702 CHORIONIC GONADOTROPIN TEST: CPT
